# Patient Record
Sex: MALE | Race: WHITE | NOT HISPANIC OR LATINO
[De-identification: names, ages, dates, MRNs, and addresses within clinical notes are randomized per-mention and may not be internally consistent; named-entity substitution may affect disease eponyms.]

---

## 2024-07-29 PROBLEM — Z00.00 ENCOUNTER FOR PREVENTIVE HEALTH EXAMINATION: Status: ACTIVE | Noted: 2024-07-29

## 2024-08-06 ENCOUNTER — APPOINTMENT (OUTPATIENT)
Dept: ORTHOPEDIC SURGERY | Facility: CLINIC | Age: 79
End: 2024-08-06

## 2024-08-06 PROCEDURE — 29105 APPLICATION LONG ARM SPLINT: CPT | Mod: LT

## 2024-08-06 PROCEDURE — 73110 X-RAY EXAM OF WRIST: CPT | Mod: LT

## 2024-08-06 PROCEDURE — 99203 OFFICE O/P NEW LOW 30 MIN: CPT | Mod: 25

## 2024-08-06 PROCEDURE — 73080 X-RAY EXAM OF ELBOW: CPT | Mod: LT

## 2024-08-20 ENCOUNTER — APPOINTMENT (OUTPATIENT)
Dept: ORTHOPEDIC SURGERY | Facility: CLINIC | Age: 79
End: 2024-08-20

## 2024-08-20 VITALS — WEIGHT: 165 LBS | BODY MASS INDEX: 25.01 KG/M2 | HEIGHT: 68 IN

## 2024-08-20 DIAGNOSIS — S52.202A UNSPECIFIED FRACTURE OF SHAFT OF LEFT ULNA, INITIAL ENCOUNTER FOR CLOSED FRACTURE: ICD-10-CM

## 2024-08-20 PROCEDURE — 29105 APPLICATION LONG ARM SPLINT: CPT | Mod: LT

## 2024-08-20 PROCEDURE — 99212 OFFICE O/P EST SF 10 MIN: CPT | Mod: 25

## 2024-08-20 PROCEDURE — 73090 X-RAY EXAM OF FOREARM: CPT | Mod: LT

## 2024-08-20 NOTE — REASON FOR VISIT
[FreeTextEntry2] : follow up left elbow 2 wks s/p ulnar shaft fx. did not get functional brace. remains in splint

## 2024-08-20 NOTE — DISCUSSION/SUMMARY
[de-identified] : Plan for application of well padded, well molded long arm/ sugar tong splint rx custom left functional brace ulnar shaft fx f/u 2 weeks for repeat x-ray, and with functional brace advised frequent repositioning to avoid pressure ulcers ----------------------------------------------------------------------------  A cast/splint was applied.  Cast/splint care was discussed. The importance of ice and elevation were discussed with the patient.  The risks were also discussed such as compartment syndrome and skin breakdown.  They were instructed to never put foreign objects down the cast.  Patients should call for increasing pain, worsening swelling, numbness, extremity discoloration, or any other concerns.  ----------------------------------------------------------------------------   All relevant imaging studies pertinent to today's visit, including x-rays, MRI's and/or other advanced imaging studies (CT/etc) were independently interpreted and reviewed with the patient as needed. Implications of the studies together with the patient's clinical picture were discussed to formulate a working diagnosis and management options were detailed.   The patient and/or guardian was advised of the diagnosis.  The natural history of the pathology was explained in full. All questions were answered.  The risks and benefits of conservative and interventional treatment alternatives were explained to the patient  The patient and/or guardian was advised if any advanced diagnostic/imaging study (MRI/CT/etc) is ordered to evaluate potential pathology in the affected area(s), they should follow up in the office to review the results of the study and determine further management that may be indicated.

## 2024-08-20 NOTE — IMAGING
[Fracture] : Fracture [Left] : left wrist [FreeTextEntry1] : isolated distal 3rd ulnar shaft fx mildly displaced [FreeTextEntry8] : isolated distal 3rd ulnar shaft fx mildly displaced

## 2024-08-20 NOTE — HISTORY OF PRESENT ILLNESS
[Left Arm] : left arm [Sudden] : sudden [Occasional] : occasional [Meds] : meds [de-identified] : This is Mr. SERGIO SEQUEIRA  a 79 year old male who comes in today complaining of left elbow pain since falling two days ago.    [] : no

## 2024-09-04 ENCOUNTER — APPOINTMENT (OUTPATIENT)
Dept: ORTHOPEDIC SURGERY | Facility: CLINIC | Age: 79
End: 2024-09-04

## 2024-09-04 DIAGNOSIS — S52.202A UNSPECIFIED FRACTURE OF SHAFT OF LEFT ULNA, INITIAL ENCOUNTER FOR CLOSED FRACTURE: ICD-10-CM

## 2024-09-04 PROCEDURE — 99212 OFFICE O/P EST SF 10 MIN: CPT | Mod: 25

## 2024-09-04 PROCEDURE — 29105 APPLICATION LONG ARM SPLINT: CPT | Mod: LT

## 2024-09-04 PROCEDURE — 73090 X-RAY EXAM OF FOREARM: CPT | Mod: LT

## 2024-09-04 NOTE — REASON FOR VISIT
[FreeTextEntry2] : follow up left elbow s/p ulnar shaft fx approx 1 month. did not get functional brace. remains in splint. pt states no pain

## 2024-09-04 NOTE — HISTORY OF PRESENT ILLNESS
[Left Arm] : left arm [Sudden] : sudden [Occasional] : occasional [Meds] : meds [de-identified] : This is Mr. SERGIO SEQUEIRA  a 79 year old male who comes in today complaining of left elbow pain since falling two days ago.    [] : no

## 2024-09-04 NOTE — IMAGING
[Fracture] : Fracture [Left] : left wrist [de-identified] : Left forearm  skin intact  neg effusion limited rom NVID    [FreeTextEntry1] : isolated distal 3rd ulnar shaft fx mildly displaced, healing demonstrated [FreeTextEntry8] : isolated distal 3rd ulnar shaft fx mildly displaced

## 2024-09-04 NOTE — DISCUSSION/SUMMARY
[de-identified] : Plan for application of well padded, well molded long arm/ sugar tong splint rx custom left functional brace ulnar shaft fx advised frequent repositioning to avoid pressure ulcers f/u 2 weeks for repeat x-ray, and with functional brace  ----------------------------------------------------------------------------  A cast/splint was applied.  Cast/splint care was discussed. The importance of ice and elevation were discussed with the patient.  The risks were also discussed such as compartment syndrome and skin breakdown.  They were instructed to never put foreign objects down the cast.  Patients should call for increasing pain, worsening swelling, numbness, extremity discoloration, or any other concerns.  ----------------------------------------------------------------------------   All relevant imaging studies pertinent to today's visit, including x-rays, MRI's and/or other advanced imaging studies (CT/etc) were independently interpreted and reviewed with the patient as needed. Implications of the studies together with the patient's clinical picture were discussed to formulate a working diagnosis and management options were detailed.   The patient and/or guardian was advised of the diagnosis.  The natural history of the pathology was explained in full. All questions were answered.  The risks and benefits of conservative and interventional treatment alternatives were explained to the patient  The patient and/or guardian was advised if any advanced diagnostic/imaging study (MRI/CT/etc) is ordered to evaluate potential pathology in the affected area(s), they should follow up in the office to review the results of the study and determine further management that may be indicated.

## 2024-09-17 ENCOUNTER — APPOINTMENT (OUTPATIENT)
Dept: ORTHOPEDIC SURGERY | Facility: CLINIC | Age: 79
End: 2024-09-17
Payer: MEDICARE

## 2024-09-17 VITALS — BODY MASS INDEX: 25.01 KG/M2 | WEIGHT: 165 LBS | HEIGHT: 68 IN

## 2024-09-17 DIAGNOSIS — S52.202A UNSPECIFIED FRACTURE OF SHAFT OF LEFT ULNA, INITIAL ENCOUNTER FOR CLOSED FRACTURE: ICD-10-CM

## 2024-09-17 PROCEDURE — 99213 OFFICE O/P EST LOW 20 MIN: CPT

## 2024-09-17 PROCEDURE — 73090 X-RAY EXAM OF FOREARM: CPT | Mod: LT

## 2024-09-17 NOTE — HISTORY OF PRESENT ILLNESS
[Left Arm] : left arm [Sudden] : sudden [Occasional] : occasional [Meds] : meds [de-identified] : This is Mr. SERGIO SEQUEIRA  a 79 year old male who comes in today complaining of left elbow pain since falling two days ago.    [] : no

## 2024-09-17 NOTE — IMAGING
[Fracture] : Fracture [Left] : left wrist [de-identified] : Left forearm  skin intact min redness over dorsal hand/ wrist  neg effusion limited rom NVID    [FreeTextEntry8] : isolated distal 3rd ulnar shaft fx mildly displaced [FreeTextEntry1] : isolated distal 3rd ulnar shaft fx mildly displaced, healing demonstrated

## 2024-09-17 NOTE — DISCUSSION/SUMMARY
[de-identified] : Plan for application of well padded, well molded long arm/ sugar tong splint rx custom left functional brace ulnar shaft fx advised frequent repositioning to avoid pressure ulcers f/u 2 weeks for repeat x-ray, and with functional brace  ----------------------------------------------------------------------------  A cast/splint was applied.  Cast/splint care was discussed. The importance of ice and elevation were discussed with the patient.  The risks were also discussed such as compartment syndrome and skin breakdown.  They were instructed to never put foreign objects down the cast.  Patients should call for increasing pain, worsening swelling, numbness, extremity discoloration, or any other concerns.  ----------------------------------------------------------------------------   All relevant imaging studies pertinent to today's visit, including x-rays, MRI's and/or other advanced imaging studies (CT/etc) were independently interpreted and reviewed with the patient as needed. Implications of the studies together with the patient's clinical picture were discussed to formulate a working diagnosis and management options were detailed.   The patient and/or guardian was advised of the diagnosis.  The natural history of the pathology was explained in full. All questions were answered.  The risks and benefits of conservative and interventional treatment alternatives were explained to the patient  The patient and/or guardian was advised if any advanced diagnostic/imaging study (MRI/CT/etc) is ordered to evaluate potential pathology in the affected area(s), they should follow up in the office to review the results of the study and determine further management that may be indicated.

## 2024-10-01 ENCOUNTER — APPOINTMENT (OUTPATIENT)
Dept: ORTHOPEDIC SURGERY | Facility: CLINIC | Age: 79
End: 2024-10-01
Payer: MEDICARE

## 2024-10-01 DIAGNOSIS — S52.202A UNSPECIFIED FRACTURE OF SHAFT OF LEFT ULNA, INITIAL ENCOUNTER FOR CLOSED FRACTURE: ICD-10-CM

## 2024-10-01 PROCEDURE — 99213 OFFICE O/P EST LOW 20 MIN: CPT

## 2024-10-01 PROCEDURE — 73080 X-RAY EXAM OF ELBOW: CPT | Mod: LT

## 2024-10-01 PROCEDURE — 73090 X-RAY EXAM OF FOREARM: CPT | Mod: LT

## 2024-10-01 NOTE — HISTORY OF PRESENT ILLNESS
[Left Arm] : left arm [Sudden] : sudden [Occasional] : occasional [Meds] : meds [de-identified] : This is Mr. SERGIO SEQUEIRA  a 79 year old male who comes in today complaining of left elbow pain since falling two days ago.    [] : no

## 2024-10-01 NOTE — IMAGING
[Fracture] : Fracture [Left] : left wrist [de-identified] : Left forearm  skin intact min redness over dorsal hand/ wrist  neg effusion limited rom NVID    [FreeTextEntry1] : isolated distal 3rd ulnar shaft fx mildly displaced, healing demonstrated [FreeTextEntry8] : isolated distal 3rd ulnar shaft fx mildly displaced

## 2024-10-01 NOTE — DISCUSSION/SUMMARY
[de-identified] : continue brace start elbow / wrist PT  f/u 2 weeks for repeat x-ray, and with functional brace  ----------------------------------------------------------------------------   All relevant imaging studies pertinent to today's visit, including x-rays, MRI's and/or other advanced imaging studies (CT/etc) were independently interpreted and reviewed with the patient as needed. Implications of the studies together with the patient's clinical picture were discussed to formulate a working diagnosis and management options were detailed.   The patient and/or guardian was advised of the diagnosis.  The natural history of the pathology was explained in full. All questions were answered.  The risks and benefits of conservative and interventional treatment alternatives were explained to the patient  The patient and/or guardian was advised if any advanced diagnostic/imaging study (MRI/CT/etc) is ordered to evaluate potential pathology in the affected area(s), they should follow up in the office to review the results of the study and determine further management that may be indicated.

## 2024-10-15 ENCOUNTER — APPOINTMENT (OUTPATIENT)
Dept: ORTHOPEDIC SURGERY | Facility: CLINIC | Age: 79
End: 2024-10-15
Payer: MEDICARE

## 2024-10-15 DIAGNOSIS — S52.202A UNSPECIFIED FRACTURE OF SHAFT OF LEFT ULNA, INITIAL ENCOUNTER FOR CLOSED FRACTURE: ICD-10-CM

## 2024-10-15 PROCEDURE — 73090 X-RAY EXAM OF FOREARM: CPT | Mod: LT

## 2024-10-15 PROCEDURE — 99212 OFFICE O/P EST SF 10 MIN: CPT

## 2024-10-29 ENCOUNTER — APPOINTMENT (OUTPATIENT)
Dept: ORTHOPEDIC SURGERY | Facility: CLINIC | Age: 79
End: 2024-10-29